# Patient Record
Sex: FEMALE | Race: OTHER | URBAN - METROPOLITAN AREA
[De-identification: names, ages, dates, MRNs, and addresses within clinical notes are randomized per-mention and may not be internally consistent; named-entity substitution may affect disease eponyms.]

---

## 2023-01-04 ENCOUNTER — EMERGENCY (EMERGENCY)
Facility: HOSPITAL | Age: 28
LOS: 0 days | Discharge: LEFT BEFORE DISCHARGE | End: 2023-01-04
Attending: EMERGENCY MEDICINE | Admitting: EMERGENCY MEDICINE
Payer: COMMERCIAL

## 2023-01-04 VITALS
HEART RATE: 98 BPM | TEMPERATURE: 97 F | WEIGHT: 136.91 LBS | OXYGEN SATURATION: 98 % | DIASTOLIC BLOOD PRESSURE: 74 MMHG | SYSTOLIC BLOOD PRESSURE: 108 MMHG | RESPIRATION RATE: 18 BRPM

## 2023-01-04 DIAGNOSIS — O99.891 OTHER SPECIFIED DISEASES AND CONDITIONS COMPLICATING PREGNANCY: ICD-10-CM

## 2023-01-04 DIAGNOSIS — Z20.822 CONTACT WITH AND (SUSPECTED) EXPOSURE TO COVID-19: ICD-10-CM

## 2023-01-04 DIAGNOSIS — R10.30 LOWER ABDOMINAL PAIN, UNSPECIFIED: ICD-10-CM

## 2023-01-04 DIAGNOSIS — Z3A.08 8 WEEKS GESTATION OF PREGNANCY: ICD-10-CM

## 2023-01-04 DIAGNOSIS — R05.9 COUGH, UNSPECIFIED: ICD-10-CM

## 2023-01-04 DIAGNOSIS — R06.02 SHORTNESS OF BREATH: ICD-10-CM

## 2023-01-04 DIAGNOSIS — Z53.29 PROCEDURE AND TREATMENT NOT CARRIED OUT BECAUSE OF PATIENT'S DECISION FOR OTHER REASONS: ICD-10-CM

## 2023-01-04 DIAGNOSIS — R07.9 CHEST PAIN, UNSPECIFIED: ICD-10-CM

## 2023-01-04 DIAGNOSIS — R09.81 NASAL CONGESTION: ICD-10-CM

## 2023-01-04 DIAGNOSIS — R00.2 PALPITATIONS: ICD-10-CM

## 2023-01-04 DIAGNOSIS — R10.31 RIGHT LOWER QUADRANT PAIN: ICD-10-CM

## 2023-01-04 DIAGNOSIS — R10.32 LEFT LOWER QUADRANT PAIN: ICD-10-CM

## 2023-01-04 PROCEDURE — 99285 EMERGENCY DEPT VISIT HI MDM: CPT

## 2023-01-04 PROCEDURE — 93010 ELECTROCARDIOGRAM REPORT: CPT

## 2023-01-04 PROCEDURE — 99282 EMERGENCY DEPT VISIT SF MDM: CPT

## 2023-01-05 VITALS
DIASTOLIC BLOOD PRESSURE: 53 MMHG | OXYGEN SATURATION: 99 % | SYSTOLIC BLOOD PRESSURE: 106 MMHG | HEART RATE: 68 BPM | RESPIRATION RATE: 18 BRPM | TEMPERATURE: 98 F

## 2023-01-05 LAB
ABO RH CONFIRMATION: SIGNIFICANT CHANGE UP
ALBUMIN SERPL ELPH-MCNC: 4.5 G/DL — SIGNIFICANT CHANGE UP (ref 3.5–5.2)
ALP SERPL-CCNC: 74 U/L — SIGNIFICANT CHANGE UP (ref 30–115)
ALT FLD-CCNC: 6 U/L — SIGNIFICANT CHANGE UP (ref 0–41)
ANION GAP SERPL CALC-SCNC: 14 MMOL/L — SIGNIFICANT CHANGE UP (ref 7–14)
APPEARANCE UR: CLEAR — SIGNIFICANT CHANGE UP
APTT BLD: 32.5 SEC — SIGNIFICANT CHANGE UP (ref 27–39.2)
AST SERPL-CCNC: 12 U/L — SIGNIFICANT CHANGE UP (ref 0–41)
BASOPHILS # BLD AUTO: 0.05 K/UL — SIGNIFICANT CHANGE UP (ref 0–0.2)
BASOPHILS NFR BLD AUTO: 0.5 % — SIGNIFICANT CHANGE UP (ref 0–1)
BILIRUB DIRECT SERPL-MCNC: <0.2 MG/DL — SIGNIFICANT CHANGE UP (ref 0–0.3)
BILIRUB INDIRECT FLD-MCNC: >0.1 MG/DL — LOW (ref 0.2–1.2)
BILIRUB SERPL-MCNC: 0.3 MG/DL — SIGNIFICANT CHANGE UP (ref 0.2–1.2)
BILIRUB UR-MCNC: NEGATIVE — SIGNIFICANT CHANGE UP
BLD GP AB SCN SERPL QL: SIGNIFICANT CHANGE UP
BUN SERPL-MCNC: 8 MG/DL — LOW (ref 10–20)
CALCIUM SERPL-MCNC: 10.1 MG/DL — SIGNIFICANT CHANGE UP (ref 8.4–10.5)
CHLORIDE SERPL-SCNC: 100 MMOL/L — SIGNIFICANT CHANGE UP (ref 98–110)
CO2 SERPL-SCNC: 22 MMOL/L — SIGNIFICANT CHANGE UP (ref 17–32)
COLOR SPEC: SIGNIFICANT CHANGE UP
CREAT SERPL-MCNC: 0.7 MG/DL — SIGNIFICANT CHANGE UP (ref 0.7–1.5)
D DIMER BLD IA.RAPID-MCNC: <150 NG/ML DDU — SIGNIFICANT CHANGE UP
DIFF PNL FLD: NEGATIVE — SIGNIFICANT CHANGE UP
EGFR: 120 ML/MIN/1.73M2 — SIGNIFICANT CHANGE UP
EOSINOPHIL # BLD AUTO: 0.06 K/UL — SIGNIFICANT CHANGE UP (ref 0–0.7)
EOSINOPHIL NFR BLD AUTO: 0.5 % — SIGNIFICANT CHANGE UP (ref 0–8)
FLUAV AG NPH QL: SIGNIFICANT CHANGE UP
FLUBV AG NPH QL: SIGNIFICANT CHANGE UP
GLUCOSE SERPL-MCNC: 81 MG/DL — SIGNIFICANT CHANGE UP (ref 70–99)
GLUCOSE UR QL: NEGATIVE — SIGNIFICANT CHANGE UP
HCG SERPL-ACNC: HIGH MIU/ML
HCT VFR BLD CALC: 33.3 % — LOW (ref 37–47)
HGB BLD-MCNC: 11 G/DL — LOW (ref 12–16)
IMM GRANULOCYTES NFR BLD AUTO: 0.3 % — SIGNIFICANT CHANGE UP (ref 0.1–0.3)
INR BLD: 0.97 RATIO — SIGNIFICANT CHANGE UP (ref 0.65–1.3)
KETONES UR-MCNC: NEGATIVE — SIGNIFICANT CHANGE UP
LEUKOCYTE ESTERASE UR-ACNC: NEGATIVE — SIGNIFICANT CHANGE UP
LIDOCAIN IGE QN: 24 U/L — SIGNIFICANT CHANGE UP (ref 7–60)
LYMPHOCYTES # BLD AUTO: 2.28 K/UL — SIGNIFICANT CHANGE UP (ref 1.2–3.4)
LYMPHOCYTES # BLD AUTO: 20.6 % — SIGNIFICANT CHANGE UP (ref 20.5–51.1)
MAGNESIUM SERPL-MCNC: 1.9 MG/DL — SIGNIFICANT CHANGE UP (ref 1.8–2.4)
MCHC RBC-ENTMCNC: 26.8 PG — LOW (ref 27–31)
MCHC RBC-ENTMCNC: 33 G/DL — SIGNIFICANT CHANGE UP (ref 32–37)
MCV RBC AUTO: 81.2 FL — SIGNIFICANT CHANGE UP (ref 81–99)
MONOCYTES # BLD AUTO: 0.98 K/UL — HIGH (ref 0.1–0.6)
MONOCYTES NFR BLD AUTO: 8.9 % — SIGNIFICANT CHANGE UP (ref 1.7–9.3)
NEUTROPHILS # BLD AUTO: 7.65 K/UL — HIGH (ref 1.4–6.5)
NEUTROPHILS NFR BLD AUTO: 69.2 % — SIGNIFICANT CHANGE UP (ref 42.2–75.2)
NITRITE UR-MCNC: NEGATIVE — SIGNIFICANT CHANGE UP
NRBC # BLD: 0 /100 WBCS — SIGNIFICANT CHANGE UP (ref 0–0)
PH UR: 6.5 — SIGNIFICANT CHANGE UP (ref 5–8)
PLATELET # BLD AUTO: 247 K/UL — SIGNIFICANT CHANGE UP (ref 130–400)
POTASSIUM SERPL-MCNC: 4.3 MMOL/L — SIGNIFICANT CHANGE UP (ref 3.5–5)
POTASSIUM SERPL-SCNC: 4.3 MMOL/L — SIGNIFICANT CHANGE UP (ref 3.5–5)
PROT SERPL-MCNC: 6.9 G/DL — SIGNIFICANT CHANGE UP (ref 6–8)
PROT UR-MCNC: SIGNIFICANT CHANGE UP
PROTHROM AB SERPL-ACNC: 11.1 SEC — SIGNIFICANT CHANGE UP (ref 9.95–12.87)
RBC # BLD: 4.1 M/UL — LOW (ref 4.2–5.4)
RBC # FLD: 14.4 % — SIGNIFICANT CHANGE UP (ref 11.5–14.5)
RSV RNA NPH QL NAA+NON-PROBE: SIGNIFICANT CHANGE UP
SARS-COV-2 RNA SPEC QL NAA+PROBE: SIGNIFICANT CHANGE UP
SODIUM SERPL-SCNC: 136 MMOL/L — SIGNIFICANT CHANGE UP (ref 135–146)
SP GR SPEC: 1.02 — SIGNIFICANT CHANGE UP (ref 1.01–1.03)
TROPONIN T SERPL-MCNC: <0.01 NG/ML — SIGNIFICANT CHANGE UP
UROBILINOGEN FLD QL: SIGNIFICANT CHANGE UP
WBC # BLD: 11.05 K/UL — HIGH (ref 4.8–10.8)
WBC # FLD AUTO: 11.05 K/UL — HIGH (ref 4.8–10.8)

## 2023-01-05 PROCEDURE — 71045 X-RAY EXAM CHEST 1 VIEW: CPT | Mod: 26

## 2023-01-05 PROCEDURE — 76705 ECHO EXAM OF ABDOMEN: CPT | Mod: 26

## 2023-01-05 PROCEDURE — 76817 TRANSVAGINAL US OBSTETRIC: CPT | Mod: 26

## 2023-01-05 RX ORDER — SODIUM CHLORIDE 9 MG/ML
1000 INJECTION INTRAMUSCULAR; INTRAVENOUS; SUBCUTANEOUS ONCE
Refills: 0 | Status: COMPLETED | OUTPATIENT
Start: 2023-01-05 | End: 2023-01-05

## 2023-01-05 RX ADMIN — SODIUM CHLORIDE 1000 MILLILITER(S): 9 INJECTION INTRAMUSCULAR; INTRAVENOUS; SUBCUTANEOUS at 00:00

## 2023-01-05 NOTE — ED ADULT NURSE NOTE - OBJECTIVE STATEMENT
28 y/o female (currently pregnant) presents to the ED c/o RLQ tenderness. Denies HA, SOB, CP, palps, n/v/d, vaginal bleeding, dysuria or any other complaints.

## 2023-01-05 NOTE — ED PROVIDER NOTE - DIFFERENTIAL DIAGNOSIS
Cardiac Arrhythmia,   cardiac ischemia,   Pneumothorax,   Appendicitis,   Ovarian Torsion,   Electrolyte abnormalities. Differential Diagnosis

## 2023-01-05 NOTE — ED PROVIDER NOTE - PROGRESS NOTE DETAILS
Surgery and GYN on call consulted, pending their evaluations and Pelvic US results, signed out to Dr. Sheffield at shift change. Pt s/o to me by Dr. Betancourt to follow up GYN and surgery consults, reassess and dispo. GYN consult and labs appreciated. Pt s/o to Dr. Fry to follow up surgery consult, reassess and dispo. Pt was signed out to me at 7am, went to see pt but she was not at her stretcher. Looked around ED and waited, but pt was nowhere to be found. Pt most likely eloped from ED.

## 2023-01-05 NOTE — CONSULT NOTE ADULT - ASSESSMENT
26 yo , 8w4d by LMP, RLQ pain, likely not GYN in origin, normal IUP, clinically and hemodynamically stable    -F/u with PMD at [facility] for followup care  -TVUS shows normal IUP no signs of torsion  -No acute gyn intervention  -Recommend other workup for abdominal pain per ED  -DISPO per ED    Dr. Melo and Dr. Antonio aware

## 2023-01-05 NOTE — ED PROVIDER NOTE - CLINICAL SUMMARY MEDICAL DECISION MAKING FREE TEXT BOX
Pt with RLQ pain, found to be pregnant. US was  done. Cleared by OB. Surgery was consulted but pt eloped prior to their evaluation. Pt was never seen by me, eloped from the ED. Pt with RLQ pain, found to be pregnant. US was  done. Cleared by OB. Surgery was consulted but pt eloped prior to their evaluation. Pt was never seen by me, eloped from the ED.    EKG shows NSR, HR is 99, AZ/QRS/QTC intervals are WNL, no significant ST elevations or depressions noted.     Laboratory results reviewed and discussed with patient. CBC shows WBC of 11 and Hb of 11; platelets are WNL.  BMP shows electrolytes WNL and no KEV. Liver Function test and lipase are WNL. HCG level is elevated and patient is pregnant. Troponin is negative.  Discussed with patient and permission obtained for CXR. Patient verbally consented for CXR.   CXR shows: no PTx, no effusions, no free air.

## 2023-01-05 NOTE — ED PROVIDER NOTE - OBJECTIVE STATEMENT
Patient states that, one week ago started having URI symptoms, which are improving, but still has cough/congestion. Patient also has been having intermittent episodes of palpitations, associated with sob and chest pain, which continued, also missed her menstrual cycle, so did the home pregnancy test 2.5 wks ago and it was positive. Patient had not seen any OBGYN doctors for her current pregnancy. Patient started having abd pain, lower abd area, about 4 days ago, which continued, and got worse, so had decided to come to ED for evaluation of her symptoms. Denies HA/neck pain/back pain. Denies lower ext pain/swelling. Denies vaginal bleeding/discharge, denies urinary symptoms.

## 2023-01-05 NOTE — CONSULT NOTE ADULT - SUBJECTIVE AND OBJECTIVE BOX
Chief Complaint: Lower right abdominal pain    HPI: 28 yo patient  @ 8w4d by LMP of 22 presents to the ED for lower right quadrant abdominal pain that started about a week ago, worse on movement, 5/10, relieved with Tylenol. Patient also endorses fevers at home for the past week. She states that she has had a cough for 3 days, non productive, associated with some chest pain when she coughs. She denies any vb, vaginal discharge, headaches, chills, lightheadedness, dizziness, LE swelling.     Ob/Gyn History:   (3x FT , 2x SAB 1x d/c)         LMP -   22               Cycle Length - 28 days  Endorses history of remote chlamydia treated and ovarian cyst Denies history of uterine fibroids, abnormal paps,     Denies the following: constitutional symptoms, visual symptoms, cardiovascular symptoms, respiratory symptoms, GI symptoms, musculoskeletal symptoms, skin symptoms, neurologic symptoms, hematologic symptoms, allergic symptoms, psychiatric symptoms  Except any pertinent positives listed.     Home Medications:  Allergies  No Known Allergies  Intolerances    PAST MEDICAL & SURGICAL HISTORY:    FAMILY HISTORY:    SOCIAL HISTORY: Denies cigarette use, alcohol use, or illicit drug use    Vital Signs Last 24 Hrs  T(F): 98.3 (2023 01:58), Max: 98.3 (2023 01:58)  HR: 68 (2023 01:58) (68 - 98)  BP: 106/53 (2023 01:58) (106/53 - 108/74)  RR: 18 (2023 01:58) (18 - 18)    Weight (kg): 62.1 (23 @ 21:04)    General Appearance - AAOx3, NAD  Abdomen - Soft, nontender, nondistended, no rebound, no rigidity, no guarding, bowel sounds present    Meds:   sodium chloride 0.9% Bolus 1000 milliLiter(s) IV Bolus once    Weight (kg): 62.1 (23 @ 21:04)    LABS:                        11.0   11.05 )-----------( 247      ( 2023 01:00 )             33.3     HCG Quantitative, Serum: 573459.0 mIU/mL (23 @ 01:00)    ABO Rh Confirmation: O POS (23 @ 00:44)  ABO RH Interpretation: O POS [2023 2:26  [email or web address]  No historical record of blood group and Rh, requires a second sample for  retype prior to the release of any blood products.  For prenatal, a  second sample on next visit.] (23 @ 00:19)  Antibody Screen: NEG (23 @ 00:19)        136  |  100  |  8<L>  ----------------------------<  81  4.3   |  22  |  0.7    Ca    10.1      2023 01:00  Mg     1.9         TPro  6.9  /  Alb  4.5  /  TBili  0.3  /  DBili  <0.2  /  AST  12  /  ALT  6   /  AlkPhos  74      PT/INR - ( 2023 01:00 )   PT: 11.10 sec;   INR: 0.97 ratio         PTT - ( 2023 01:00 )  PTT:32.5 sec  Urinalysis Basic - ( 2023 01:00 )    Color: Light Yellow / Appearance: Clear / S.018 / pH: x  Gluc: x / Ketone: Negative  / Bili: Negative / Urobili: <2 mg/dL   Blood: x / Protein: Trace / Nitrite: Negative   Leuk Esterase: Negative / RBC: x / WBC x   Sq Epi: x / Non Sq Epi: x / Bacteria: x          RADIOLOGY & ADDITIONAL STUDIES:  < from: US Transvaginal, OB (23 @ 03:55) >    ******PRELIMINARY REPORT******      ******PRELIMINARY REPORT******       ACC: [id number] EXAM:  US OB TRANSVAGINAL                          PROCEDURE DATE:  2023    ******PRELIMINARY REPORT******      ******PRELIMINARY REPORT******           INTERPRETATION:  CLINICAL INFORMATION: Right lower quadrant abdominal   pain and constipation of 4 days' duration.    LMP: 2022    Estimated Gestational Age by LMP: 8 weeks 4 days    COMPARISON: None available.    Endovaginal and transabdominal pelvic sonogram. Color and Spectral   Doppler was performed.      FINDINGS:  Uterus: Gravid uterus with live intrauterine pregnancy. Uterus measures   12.1 x 6.3 x 8.1 cm.    Gestational Sac Size (mean): 2.68  Gestations Sac Shape : Normal.  Crown Rump Length: 1.1 cm  Estimated Gestational Age: 7 weeks 2 days (+/- 4 days) by mean   gestational sac size.  Yolk Sac: Normal  Fetal Heart Rate: 142    Right ovary: 3.8 x 2.6 x 2.9 cm, volume 14 cc. Within normal limits.   Vascular flow demonstrated.  Leftovary: Not visualized.    Fluid: None.      IMPRESSION:  1.  No sonographic evidence of right ovarian torsion.  2.  Left ovary not visualized.  3.  Single live intrauterine gestation with fetal heart rate 142 (normal).  4.  Estimated gestational age of 7 weeks 4 days (+/- 4 days) by mean   gestational sac size.  5.  Estimated gestational age of 8 weeks 4 days by last menstrual period.  6.  Estimated due date of  2023 (by last menstrual period) and   2023 (by mean gestational sac size).  7.  Recommend nonemergent/outpatient OB/GYN for fetal gestational age   discrepancy.          ******PRELIMINARY REPORT******      ******PRELIMINARY REPORT******       INNA ROBISON MD; Resident Radiologist  This document is a PRELIMINARY interpretation and is pending final   attending approval. 2023  5:29AM    < end of copied text >  < from: US Appendix (US Appendix .) (23 @ 03:26) >  *****PRELIMINARY REPORT******      ******PRELIMINARY REPORT******       ACC: [id number] EXAM:  US APPENDIX                          PROCEDURE DATE:  2023    ******PRELIMINARY REPORT******      ******PRELIMINARY REPORT******           INTERPRETATION:  US APPENDIX    HISTORY: Pregnant. Right lower quadrant abdominal pain. Constipation of 4   days' duration.    COMPARISON: None.    TECHNIQUE: Grayscale and color Doppler evaluation of the right lower   quadrant was performed with focus on the appendix.      FINDINGS:  APPENDIX:  1.  Visualization: No  2.  Secondary signs: None. No hyperechogenic mesentery, focal fluid   collection or localized aperistaltic dilated bowel in the right lower   quadrant.  3.  Additional findings: None. No evidence for focal wall thickening in   the terminal ileum or cecum. No enlarged or hyperemic mesenteric lymph   nodes.      IMPRESSION:  1.  Appendix not seen and no secondary signs.  2.  Please see separately dictated pelvic sonogram for pelvic findings.        ******PRELIMINARY REPORT******      ******PRELIMINARY REPORT******       INNA ROBISON MD; Resident Radiologist  This document is a PRELIMINARY interpretation and is pending final   attending approval. 2023  4:42AM    < end of copied text >

## 2023-01-06 LAB
CULTURE RESULTS: SIGNIFICANT CHANGE UP
SPECIMEN SOURCE: SIGNIFICANT CHANGE UP

## 2023-06-07 NOTE — ED PROVIDER NOTE - IV ALTEPLASE EXCL REL HIDDEN
Take the antibiotics as prescribed for the next 7 days  Use the bacitracin ointment once daily on the finger laceration  Follow-up with hand surgery soon as possible  You will need the sutures removed in 7 days  Come back to the emergency department for spreading redness, pus discharge, fevers  Wear the splint until you see hand surgery  If you are unable to follow-up with hand surgery come back to the emergency department for suture removal in 7 days  show